# Patient Record
Sex: MALE | Race: WHITE | NOT HISPANIC OR LATINO
[De-identification: names, ages, dates, MRNs, and addresses within clinical notes are randomized per-mention and may not be internally consistent; named-entity substitution may affect disease eponyms.]

---

## 2021-10-18 ENCOUNTER — APPOINTMENT (OUTPATIENT)
Dept: CARDIOLOGY | Facility: CLINIC | Age: 66
End: 2021-10-18
Payer: COMMERCIAL

## 2021-10-18 ENCOUNTER — RESULT CHARGE (OUTPATIENT)
Age: 66
End: 2021-10-18

## 2021-10-18 VITALS — HEART RATE: 52 BPM | DIASTOLIC BLOOD PRESSURE: 72 MMHG | SYSTOLIC BLOOD PRESSURE: 117 MMHG

## 2021-10-18 VITALS — HEIGHT: 69 IN | TEMPERATURE: 97.6 F | WEIGHT: 204 LBS | BODY MASS INDEX: 30.21 KG/M2

## 2021-10-18 DIAGNOSIS — Z82.49 FAMILY HISTORY OF ISCHEMIC HEART DISEASE AND OTHER DISEASES OF THE CIRCULATORY SYSTEM: ICD-10-CM

## 2021-10-18 DIAGNOSIS — F17.200 NICOTINE DEPENDENCE, UNSPECIFIED, UNCOMPLICATED: ICD-10-CM

## 2021-10-18 DIAGNOSIS — Z78.9 OTHER SPECIFIED HEALTH STATUS: ICD-10-CM

## 2021-10-18 PROBLEM — Z00.00 ENCOUNTER FOR PREVENTIVE HEALTH EXAMINATION: Noted: 2021-10-18

## 2021-10-18 PROCEDURE — 99203 OFFICE O/P NEW LOW 30 MIN: CPT

## 2021-10-18 PROCEDURE — 93228 REMOTE 30 DAY ECG REV/REPORT: CPT

## 2021-10-18 NOTE — PHYSICAL EXAM
[Well Developed] : well developed [Well Nourished] : well nourished [No Acute Distress] : no acute distress [Normal Conjunctiva] : normal conjunctiva [Normal Venous Pressure] : normal venous pressure [5th Left ICS - MCL] : palpated at the 5th LICS in the midclavicular line [Normal] : normal [No Precordial Heave] : no precordial heave was noted [Rhythm Regular] : regular [Normal S1] : normal S1 [Normal S2] : normal S2 [No Murmur] : no murmurs heard [No Pitting Edema] : no pitting edema present [2+] : left 2+ [Clear Lung Fields] : clear lung fields [Good Air Entry] : good air entry [No Respiratory Distress] : no respiratory distress  [Soft] : abdomen soft [Non Tender] : non-tender [Normal Bowel Sounds] : normal bowel sounds [Normal Gait] : normal gait [No Edema] : no edema [No Varicosities] : no varicosities [No Rash] : no rash [Moves all extremities] : moves all extremities [No Focal Deficits] : no focal deficits [Alert and Oriented] : alert and oriented [Bradycardia] : bradycardic

## 2021-10-18 NOTE — REVIEW OF SYSTEMS
[Negative] : Heme/Lymph [Syncope] : syncope [SOB] : no shortness of breath [Dyspnea on exertion] : not dyspnea during exertion [Chest Discomfort] : no chest discomfort [Palpitations] : no palpitations [Orthopnea] : no orthopnea

## 2021-10-18 NOTE — HISTORY OF PRESENT ILLNESS
[FreeTextEntry1] : Ms. Dominguez is a 67yo male with PMHx of DM, depression, obesity who presents for initial evaluation and treatment of syncope and bradycardia. He is a patient of Dr. Kimani Eller. Patient has been feeling well. Over the weekend, he had "cheated" on his diet and had 2 cigars and sambuca, while watching football. He felt very tired after this. When he went to sit down for dinner, he felt lightheaded and passed out. He did not sustain any injuries. He briefly lost conciousness. His blood sugar was elevated (FS 200s) and BP and HR were normal, per wife. He has never had an episode like this before. Denies CP, SOB, palpitations.  Father with heart attack in 50s, mother heart condition (PPM placed), brother AF.

## 2021-10-18 NOTE — REASON FOR VISIT
[Other: ____] : [unfilled] [FreeTextEntry1] : Diagnostic Tests:\par --------------------\par EKG:\par 10/18/21-Sinus bradycardia at 50 bpm. 1st degree AVB. IVCD.

## 2021-10-18 NOTE — ASSESSMENT
[FreeTextEntry1] : Syncope: Patient with syncope in setting of sinus remi, first degree AVB and IVCD. No history of CAD in past, but with risk factors. EKG non-ischemic. Patient with prodrome prior to syncope and remembers event. \par -Check echocardiogram for structural heart disease.\par -MCOT to be placed in office 10/19/21. \par -Will monitor for episodes of symptomatic bradycardia and pauses. \par -Directed patient to go to ED if episode occurs again. \par -May need to pursue ischemic work-up. \par \par DM: HA1c 5.8% (7/16/21)\par -Continue metformin 1000mg PO bid. \par -Continue with atorvastatin (pt unsure of dose). \par \par Dyslipidemia: , LDL 67, HDL 50, TG 82\par -Continue with atorvastatin. \par \par Follow up in 2 weeks. \par

## 2021-10-28 ENCOUNTER — APPOINTMENT (OUTPATIENT)
Dept: CARDIOLOGY | Facility: CLINIC | Age: 66
End: 2021-10-28
Payer: COMMERCIAL

## 2021-10-28 PROCEDURE — 93306 TTE W/DOPPLER COMPLETE: CPT

## 2021-11-01 NOTE — PHYSICAL EXAM
[Well Developed] : well developed [Well Nourished] : well nourished [No Acute Distress] : no acute distress [Normal Conjunctiva] : normal conjunctiva [Normal Venous Pressure] : normal venous pressure [5th Left ICS - MCL] : palpated at the 5th LICS in the midclavicular line [Normal] : normal [No Precordial Heave] : no precordial heave was noted [Bradycardia] : bradycardic [Rhythm Regular] : regular [Normal S1] : normal S1 [Normal S2] : normal S2 [No Murmur] : no murmurs heard [No Pitting Edema] : no pitting edema present [2+] : left 2+ [Clear Lung Fields] : clear lung fields [Good Air Entry] : good air entry [No Respiratory Distress] : no respiratory distress  [Soft] : abdomen soft [Non Tender] : non-tender [Normal Bowel Sounds] : normal bowel sounds [Normal Gait] : normal gait [No Edema] : no edema [No Varicosities] : no varicosities [No Rash] : no rash [Moves all extremities] : moves all extremities [No Focal Deficits] : no focal deficits [Alert and Oriented] : alert and oriented

## 2021-11-04 ENCOUNTER — APPOINTMENT (OUTPATIENT)
Dept: CARDIOLOGY | Facility: CLINIC | Age: 66
End: 2021-11-04
Payer: COMMERCIAL

## 2021-11-04 VITALS
BODY MASS INDEX: 29.92 KG/M2 | HEIGHT: 69 IN | DIASTOLIC BLOOD PRESSURE: 64 MMHG | WEIGHT: 202 LBS | TEMPERATURE: 96.8 F | SYSTOLIC BLOOD PRESSURE: 122 MMHG

## 2021-11-04 VITALS — HEART RATE: 54 BPM

## 2021-11-04 DIAGNOSIS — E11.9 TYPE 2 DIABETES MELLITUS W/OUT COMPLICATIONS: ICD-10-CM

## 2021-11-04 PROCEDURE — 99212 OFFICE O/P EST SF 10 MIN: CPT

## 2021-11-04 PROCEDURE — 93000 ELECTROCARDIOGRAM COMPLETE: CPT

## 2021-11-04 RX ORDER — ATORVASTATIN CALCIUM 80 MG/1
TABLET, FILM COATED ORAL
Refills: 0 | Status: DISCONTINUED | COMMUNITY
End: 2021-11-04

## 2021-11-04 NOTE — HISTORY OF PRESENT ILLNESS
[FreeTextEntry1] : Ms. Dominguez is a 67yo male with PMHx of DM, depression, obesity who presents for initial evaluation and treatment of syncope and bradycardia. He is a patient of Dr. Kimani Eller. Patient has been feeling well. Over the weekend, he had "cheated" on his diet and had 2 cigars and sambuca, while watching football. He felt very tired after this. When he went to sit down for dinner, he felt lightheaded and passed out. He did not sustain any injuries. He briefly lost consciousness. His blood sugar was elevated (FS 200s) and BP and HR were normal, per wife. He has never had an episode like this before. Denies CP, SOB, palpitations.  Father with heart attack in 50s, mother heart condition (PPM placed), brother AF. He still feels fatigued but no other syncopal episodes.

## 2021-11-04 NOTE — ASSESSMENT
[FreeTextEntry1] : Syncope: Patient with syncope in setting of sinus remi, first degree AVB and IVCD. No history of CAD in past, but with risk factors. EKG non-ischemic. Patient with prodrome prior to syncope and remembers event. TTE without structural abnormalities. MCOT without any pauses or HB. \par -MCOT placed in office 10/19/21. Will follow daily and final report. \par -Directed patient to go to ED if episode occurs again. \par -Will get stress echocardiogram to check for ischemia and chronotropic incompetence. \par \par DM: HA1c 5.8% (7/16/21)\par -Continue metformin 1000mg PO bid. \par -Continue with atorvastatin 10mg PO daily. \par \par Dyslipidemia: , LDL 67, HDL 50, TG 82\par -Continue with atorvastatin. \par \par Follow up in 3 months. \par

## 2021-11-04 NOTE — REASON FOR VISIT
[Other: ____] : [unfilled] [FreeTextEntry1] : Diagnostic Tests:\par --------------------\par EKG:\par 11/04/21-Sinus bradycardia @ 54 bpm. Possible old septal MI. \par 10/18/21-Sinus bradycardia at 50 bpm. 1st degree AVB. IVCD. \par --------------------\par Echo:\par 10/28/21-TTE: EF 65%. Mild MV leaflet thickening. Trace MR.

## 2021-11-04 NOTE — REVIEW OF SYSTEMS
[Syncope] : syncope [Negative] : Heme/Lymph [SOB] : no shortness of breath [Dyspnea on exertion] : not dyspnea during exertion [Chest Discomfort] : no chest discomfort [Palpitations] : no palpitations [Orthopnea] : no orthopnea

## 2021-11-29 ENCOUNTER — APPOINTMENT (OUTPATIENT)
Dept: CARDIOLOGY | Facility: CLINIC | Age: 66
End: 2021-11-29
Payer: COMMERCIAL

## 2021-11-29 PROCEDURE — 93351 STRESS TTE COMPLETE: CPT

## 2021-11-29 PROCEDURE — 93325 DOPPLER ECHO COLOR FLOW MAPG: CPT

## 2021-11-29 PROCEDURE — 93321 DOPPLER ECHO F-UP/LMTD STD: CPT

## 2022-02-17 ENCOUNTER — APPOINTMENT (OUTPATIENT)
Dept: CARDIOLOGY | Facility: CLINIC | Age: 67
End: 2022-02-17

## 2022-03-03 ENCOUNTER — APPOINTMENT (OUTPATIENT)
Dept: CARDIOLOGY | Facility: CLINIC | Age: 67
End: 2022-03-03

## 2022-06-09 ENCOUNTER — APPOINTMENT (OUTPATIENT)
Dept: CARDIOLOGY | Facility: CLINIC | Age: 67
End: 2022-06-09
Payer: COMMERCIAL

## 2022-06-09 VITALS — HEIGHT: 69 IN | TEMPERATURE: 98.6 F | BODY MASS INDEX: 30.51 KG/M2 | WEIGHT: 206 LBS

## 2022-06-09 VITALS — HEART RATE: 58 BPM | DIASTOLIC BLOOD PRESSURE: 78 MMHG | SYSTOLIC BLOOD PRESSURE: 112 MMHG

## 2022-06-09 PROCEDURE — 99212 OFFICE O/P EST SF 10 MIN: CPT

## 2022-06-09 PROCEDURE — 93000 ELECTROCARDIOGRAM COMPLETE: CPT

## 2022-06-09 NOTE — REASON FOR VISIT
[Other: ____] : [unfilled] [FreeTextEntry1] : Diagnostic Tests:\par --------------------\par EKG:\par 06/09/22-Sinus bradycardia at 58 bpm. IVCD. \par 11/04/21-Sinus bradycardia @ 54 bpm. Possible old septal MI. \par 10/18/21-Sinus bradycardia at 50 bpm. 1st degree AVB. IVCD. \par --------------------\par Echo:\par 10/28/21-TTE: EF 65%. Mild MV leaflet thickening. Trace MR. \par --------------------\par Stress:\par 11/29/21- Exercise TTE: Negative stress test. Exercised 10:23 with 87% MPHR, 12.1 METS. \par --------------------\par Monitor:\par 11/22/21- MCOT: 2 week monitor time. 1st degree AVB with HR 48 bpm. Asymptomatic. Low burden PVCs, PACs.

## 2022-06-09 NOTE — ASSESSMENT
[FreeTextEntry1] : Syncope:\par -  Patient has Hx of syncope in setting of sinus remi, first degree AVB and IVCD. No history of CAD in past, but with risk factors. EKG non-ischemic. Patient with prodrome prior to syncope and remembers event. TTE without structural abnormalities. MCOT without any pauses or HB. \par -Episode likely due to food and alcohol intake. Likely vasovagal. \par -Directed patient to go to ED if episode occurs again. \par \par Claudication of lower extremities\par - will obtain WINDY/PVR\par - arterial doppler of LE to r/o PAD\par \par DM: HA1c 6.4% (7/16/21)\par -Continue metformin 1000mg PO bid. \par -Continue with atorvastatin 10mg PO daily. \par - follow up with endocrinology\par \par Dyslipidemia: \par -Continue with atorvastatin 10 mg PO daily \par \par Follow up in 3 months. \par

## 2022-06-09 NOTE — HISTORY OF PRESENT ILLNESS
[FreeTextEntry1] : Ms. Dominguez is a 67yo male with PMHx of DM, depression, obesity who presents for initial evaluation and treatment of syncope and bradycardia. He is a patient of Dr. Kimani Eller. Patient has been feeling well. Over the weekend, he had "cheated" on his diet and had 2 cigars and sambuca, while watching football. He felt very tired after this. When he went to sit down for dinner, he felt lightheaded and passed out. He did not sustain any injuries. He briefly lost consciousness. His blood sugar was elevated (FS 200s) and BP and HR were normal, per wife. He has never had an episode like this before. Denies CP, SOB, palpitations.  Father with heart attack in 50s, mother heart condition (PPM placed), brother AF. He still feels fatigued but no other syncopal episodes. \par \par 06/09/2022: Patient presents for F/U cardiology visit. He denies recurrent syncope episodes. Cholesterol is at target on current regimen. HgA1C<6.4, DM is well controlled. Patient has a scheduled appointment with endocrinologist soon. Patient complains of leg pain, feet pain while asleep. Has noticeable hair loss in patches on lower and upper extremities. \par \par

## 2022-06-09 NOTE — REVIEW OF SYSTEMS
[Syncope] : syncope [Muscle Cramps] : muscle cramps [Negative] : Heme/Lymph [FreeTextEntry9] : back pain [SOB] : no shortness of breath [Dyspnea on exertion] : not dyspnea during exertion [Chest Discomfort] : no chest discomfort [Palpitations] : no palpitations [Orthopnea] : no orthopnea

## 2022-06-11 NOTE — REVIEW OF SYSTEMS
[SOB] : no shortness of breath [Dyspnea on exertion] : not dyspnea during exertion [Chest Discomfort] : no chest discomfort [Palpitations] : no palpitations [Orthopnea] : no orthopnea

## 2022-06-11 NOTE — REASON FOR VISIT
[FreeTextEntry1] : Diagnostic Tests:\par --------------------\par EKG:\par 11/04/21-Sinus bradycardia @ 54 bpm. Possible old septal MI. \par 10/18/21-Sinus bradycardia at 50 bpm. 1st degree AVB. IVCD. \par --------------------\par Echo:\par 10/28/21-TTE: EF 65%. Mild MV leaflet thickening. Trace MR. \par --------------------\par Stress:\par 11/29/21- Exercise TTE: Negative stress test. Exercised 10:23 with 87% MPHR, 12.1 METS. \par --------------------\par Monitor:\par 11/22/21- MCOT: 2 week monitor time. 1st degree AVB with HR 48 bpm. Asymptomatic. Low burden PVCs, PACs.

## 2022-06-11 NOTE — ASSESSMENT
[FreeTextEntry1] : Syncope: Patient with syncope in setting of sinus remi, first degree AVB and IVCD. No history of CAD in past, but with risk factors. EKG non-ischemic. Patient with prodrome prior to syncope and remembers event. TTE without structural abnormalities. MCOT without any pauses or HB. \par -Episode likely due to food and alcohol intake. Likely vasovagal. \par -Directed patient to go to ED if episode occurs again. \par \par \par DM: HA1c 5.8% (7/16/21)\par -Continue metformin 1000mg PO bid. \par -Continue with atorvastatin 10mg PO daily. \par \par Dyslipidemia: , LDL 67, HDL 50, TG 82\par -Continue with atorvastatin. \par \par Follow up in 3 months. \par

## 2022-06-11 NOTE — HISTORY OF PRESENT ILLNESS
[FreeTextEntry1] : Ms. Dominguez is a 65yo male with PMHx of DM, depression, obesity who presents for initial evaluation and treatment of syncope and bradycardia. He is a patient of Dr. Kimani Eller. Patient has been feeling well. Over the weekend, he had "cheated" on his diet and had 2 cigars and sambuca, while watching football. He felt very tired after this. When he went to sit down for dinner, he felt lightheaded and passed out. He did not sustain any injuries. He briefly lost consciousness. His blood sugar was elevated (FS 200s) and BP and HR were normal, per wife. He has never had an episode like this before. Denies CP, SOB, palpitations.  Father with heart attack in 50s, mother heart condition (PPM placed), brother AF. He still feels fatigued but no other syncopal episodes.

## 2022-07-27 ENCOUNTER — APPOINTMENT (OUTPATIENT)
Dept: NEUROLOGY | Facility: CLINIC | Age: 67
End: 2022-07-27

## 2022-07-27 DIAGNOSIS — M54.16 RADICULOPATHY, LUMBAR REGION: ICD-10-CM

## 2022-07-27 PROCEDURE — 99072 ADDL SUPL MATRL&STAF TM PHE: CPT

## 2022-07-27 PROCEDURE — 99214 OFFICE O/P EST MOD 30 MIN: CPT

## 2022-07-27 NOTE — PHYSICAL EXAM
[Person] : oriented to person [Place] : oriented to place [Time] : oriented to time [Concentration Intact] : normal concentrating ability [Visual Intact] : visual attention was ~T not ~L decreased [Naming Objects] : no difficulty naming common objects [Repeating Phrases] : no difficulty repeating a phrase [Writing A Sentence] : no difficulty writing a sentence [Fluency] : fluency intact [Comprehension] : comprehension intact [Reading] : reading intact [Past History] : adequate knowledge of personal past history [Cranial Nerves Optic (II)] : visual acuity intact bilaterally,  visual fields full to confrontation, pupils equal round and reactive to light [Cranial Nerves Oculomotor (III)] : extraocular motion intact [Cranial Nerves Trigeminal (V)] : facial sensation intact symmetrically [Cranial Nerves Facial (VII)] : face symmetrical [Cranial Nerves Vestibulocochlear (VIII)] : hearing was intact bilaterally [Cranial Nerves Glossopharyngeal (IX)] : tongue and palate midline [Cranial Nerves Accessory (XI - Cranial And Spinal)] : head turning and shoulder shrug symmetric [Cranial Nerves Hypoglossal (XII)] : there was no tongue deviation with protrusion [Motor Tone] : muscle tone was normal in all four extremities [Motor Strength] : muscle strength was normal in all four extremities [No Muscle Atrophy] : normal bulk in all four extremities [Abnormal Walk] : normal gait [Balance] : balance was intact [Past-pointing] : there was no past-pointing [Tremor] : no tremor present [1+] : Ankle jerk left 1+ [Plantar Reflex Right Only] : normal on the right [Plantar Reflex Left Only] : normal on the left [FreeTextEntry6] :   Limited range of motion in the back, slight pain limited weakness in the lower extremities bilaterally 4+/5.  Negative straight leg test bilaterally [FreeTextEntry7] :  decreased sensation distally in both feet [FreeTextEntry8] :  slight antalgic gait, walks with cane, slow and cautious when  changing 1 position to the other

## 2022-07-27 NOTE — HISTORY OF PRESENT ILLNESS
[FreeTextEntry1] : Patient is a 67 year old man who presents for initial neurology consultation regards to lower back pain precipitated by work related injury he sustained on July 18, 2022. The patient reports he was in the bathroom when he slipped on some water landing on back and buttocks. Since the incident patient developed lower back pain that radiates down lower extremities posteriorly with associated burning and discomfort sensation of bilateral feet. He initially rated his pain 10/10 on the pain scale. He was given Prednisone and muscle relaxant which offers temporary relief.   The pain started coming back, mostly in his legs after the effect of steroid  worn off.  He now rates the baseline pain of 7/10 with pain shooting down both of his legs and with his toes feel numb.   He is currently ambulating with a cane since the fall. He continues to be able to shower and dress himself but slowly., and has been working from home.\par \par  he did have back injury many years ago but had been asymptomatic until this recent accident happened.  Even though he is diabetic, he has never had symptoms of neuropathy in the past.

## 2022-07-27 NOTE — ASSESSMENT
[FreeTextEntry1] : lumbar radiculopathy -I would like to send [Him/Her] for MRI of the lumbarl spine without quincy, and EMG/NCS of lower extremities for further evaluation.  For symptomatic relief, I recommend a trial of conservative treatment including physical therapy/chiropractics/acupuncture as well as the use of gabapentin, but if He is not better in a month, I will refer her to pain management for further treatment.  I warned him of the potential drowsiness side effect of gabapentin and he reported understanding.\par \par \par  his symptom is causally related to the work related injury that he sustained, he is considered to have   Moderate disability due to pain but is able to work from home.

## 2022-08-01 PROBLEM — Z00.00 ENCOUNTER FOR PREVENTIVE HEALTH EXAMINATION: Status: ACTIVE | Noted: 2022-08-01

## 2022-08-09 ENCOUNTER — APPOINTMENT (OUTPATIENT)
Dept: CARDIOLOGY | Facility: CLINIC | Age: 67
End: 2022-08-09

## 2022-08-09 PROCEDURE — 93923 UPR/LXTR ART STDY 3+ LVLS: CPT

## 2022-08-09 PROCEDURE — 93925 LOWER EXTREMITY STUDY: CPT

## 2022-08-18 ENCOUNTER — APPOINTMENT (OUTPATIENT)
Dept: NEUROLOGY | Facility: CLINIC | Age: 67
End: 2022-08-18

## 2022-08-23 ENCOUNTER — APPOINTMENT (OUTPATIENT)
Dept: ENDOCRINOLOGY | Facility: CLINIC | Age: 67
End: 2022-08-23

## 2022-08-26 ENCOUNTER — APPOINTMENT (OUTPATIENT)
Dept: ORTHOPEDIC SURGERY | Facility: CLINIC | Age: 67
End: 2022-08-26

## 2022-09-07 ENCOUNTER — APPOINTMENT (OUTPATIENT)
Dept: NEUROLOGY | Facility: CLINIC | Age: 67
End: 2022-09-07

## 2022-10-04 ENCOUNTER — APPOINTMENT (OUTPATIENT)
Dept: ENDOCRINOLOGY | Facility: CLINIC | Age: 67
End: 2022-10-04

## 2022-10-04 VITALS
SYSTOLIC BLOOD PRESSURE: 130 MMHG | TEMPERATURE: 98.2 F | HEART RATE: 109 BPM | DIASTOLIC BLOOD PRESSURE: 70 MMHG | WEIGHT: 207 LBS | BODY MASS INDEX: 30.66 KG/M2 | HEIGHT: 69 IN

## 2022-10-04 PROCEDURE — 99204 OFFICE O/P NEW MOD 45 MIN: CPT

## 2022-10-04 RX ORDER — GABAPENTIN 100 MG/1
100 CAPSULE ORAL 3 TIMES DAILY
Qty: 90 | Refills: 2 | Status: COMPLETED | COMMUNITY
Start: 2022-07-27 | End: 2022-10-04

## 2022-10-06 ENCOUNTER — RESULT CHARGE (OUTPATIENT)
Age: 67
End: 2022-10-06

## 2022-10-06 ENCOUNTER — APPOINTMENT (OUTPATIENT)
Dept: CARDIOLOGY | Facility: CLINIC | Age: 67
End: 2022-10-06

## 2022-10-06 VITALS — WEIGHT: 201 LBS | TEMPERATURE: 97.6 F | BODY MASS INDEX: 29.77 KG/M2 | HEIGHT: 69 IN

## 2022-10-06 VITALS — HEART RATE: 64 BPM

## 2022-10-06 VITALS — DIASTOLIC BLOOD PRESSURE: 80 MMHG | SYSTOLIC BLOOD PRESSURE: 120 MMHG

## 2022-10-06 DIAGNOSIS — M54.16 RADICULOPATHY, LUMBAR REGION: ICD-10-CM

## 2022-10-06 DIAGNOSIS — R00.1 BRADYCARDIA, UNSPECIFIED: ICD-10-CM

## 2022-10-06 DIAGNOSIS — M48.062 SPINAL STENOSIS, LUMBAR REGION WITH NEUROGENIC CLAUDICATION: ICD-10-CM

## 2022-10-06 DIAGNOSIS — R55 SYNCOPE AND COLLAPSE: ICD-10-CM

## 2022-10-06 PROCEDURE — 99213 OFFICE O/P EST LOW 20 MIN: CPT

## 2022-10-06 PROCEDURE — 93000 ELECTROCARDIOGRAM COMPLETE: CPT

## 2022-10-06 RX ORDER — SERTRALINE HYDROCHLORIDE 150 MG/1
150 CAPSULE ORAL
Refills: 0 | Status: ACTIVE | COMMUNITY

## 2022-10-06 RX ORDER — SERTRALINE HYDROCHLORIDE 100 MG/1
100 TABLET, FILM COATED ORAL
Refills: 0 | Status: DISCONTINUED | COMMUNITY
End: 2022-10-06

## 2022-10-06 NOTE — HISTORY OF PRESENT ILLNESS
[FreeTextEntry1] : Ms. Dominguez is a 67yo male with PMHx of DM, depression, obesity who presents for initial evaluation and treatment of syncope and bradycardia. He is a patient of Dr. Kimani Eller. Patient has been feeling well. Over the weekend, he had "cheated" on his diet and had 2 cigars and sambuca, while watching football. He felt very tired after this. When he went to sit down for dinner, he felt lightheaded and passed out. He did not sustain any injuries. He briefly lost consciousness. His blood sugar was elevated (FS 200s) and BP and HR were normal, per wife. He has never had an episode like this before. Denies CP, SOB, palpitations.  Father with heart attack in 50s, mother heart condition (PPM placed), brother AF. He still feels fatigued but no other syncopal episodes. \par \par -About 3 months ago, he had a slip and fall at work and aggravated spinal stenosis. Currently undergoing epidural steroid injections. He feels somewhat better. No significant LE arterial stenosis to explain leg cramping and pain. \par

## 2022-10-06 NOTE — REVIEW OF SYSTEMS
[Muscle Cramps] : muscle cramps [Syncope] : syncope [Negative] : Heme/Lymph [FreeTextEntry9] : back pain [SOB] : no shortness of breath [Dyspnea on exertion] : not dyspnea during exertion [Chest Discomfort] : no chest discomfort [Palpitations] : no palpitations [Orthopnea] : no orthopnea

## 2022-10-06 NOTE — REASON FOR VISIT
[Other: ____] : [unfilled] [FreeTextEntry1] : Diagnostic Tests:\par --------------------\par EKG:\par 10/06/22-NSR. \par 06/09/22-Sinus bradycardia at 58 bpm. IVCD. \par 11/04/21-Sinus bradycardia at 54 bpm. Possible old septal MI. \par 10/18/21-Sinus bradycardia at 50 bpm. 1st degree AVB. IVCD. \par --------------------\par Echo:\par 10/28/21-TTE: EF 65%. Mild MV leaflet thickening. Trace MR. \par --------------------\par Stress:\par 11/29/21- Exercise TTE: Negative stress test. Exercised 10:23 with 87% MPHR, 12.1 METS. \par --------------------\par Monitor:\par 11/22/21- MCOT: 2 week monitor time. 1st degree AVB with HR 48 bpm. Asymptomatic. Low burden PVCs, PACs. \par -------------------\par Physio:\par 08/09/22-WINDY/PVR: Normal WINDY B/L. Noncompressible high/low thigh, possible calcific disease. \par -------------------\par US: \par 08/10/22-LE Arterial: Mild diffuse atherosclerosis B/L. Right Pop <20%. Left CFA 20-49%. Left Pop <20%.

## 2022-10-06 NOTE — ASSESSMENT
[FreeTextEntry1] : Syncope:\par -  Patient has Hx of syncope in setting of sinus remi, first degree AVB and IVCD. No history of CAD in past, but with risk factors. EKG non-ischemic. Patient with prodrome prior to syncope and remembers event. TTE without structural abnormalities. MCOT without any pauses or HB. \par -Episode likely due to food and alcohol intake. Likely vasovagal. \par -Directed patient to go to ED if episode occurs again. \par \par Claudication of lower extremities: Mild B/L LE atherosclerosis.\par -Will continue to monitor and treat risk factors. \par \par DM: HA1c 6.4%-> 6.8% (7/16/21)\par -Continue metformin 1000mg PO bid. \par -Continue with atorvastatin 10mg PO daily. \par - follow up with endocrinology\par \par Dyslipidemia: , TG 79, HDL 54, LDL 89\par -Continue with atorvastatin 10 mg PO daily \par -Discussed therapeutic lifestyle changes to promote improved lipid metabolism\par \par \par Follow up in 6 months. \par

## 2022-10-10 NOTE — THERAPY
[Today's Date] : [unfilled] [BG Target = ____] : BG Target = [unfilled] [FreeTextEntry7] : metformin 500 mg bid

## 2022-10-10 NOTE — DATA REVIEWED
[FreeTextEntry1] : 9/30/22 glucose 146, bun 30, creatinine 1.01, gfr 82, bun 30 , hgba1c 6.8 h, microalbumin <0.2, chol 160, hdl 54, trig 79, ldl 89,tsh 2.09, ft4 1.2

## 2022-10-10 NOTE — HISTORY OF PRESENT ILLNESS
[Finger Stick] : Finger Stick: Yes [Continuous Glucose Monitoring] : Continuous Glucose Monitoring: No [Hypoglycemia] : Patient is not hypoglycemic. [FreeTextEntry9] : 155-160 [de-identified] : 140 [FreeTextEntry1] : 1-3 x a day prn

## 2022-10-10 NOTE — ASSESSMENT
[Diabetes Foot Care] : diabetes foot care [Long Term Vascular Complications] : long term vascular complications of diabetes [Carbohydrate Consistent Diet] : carbohydrate consistent diet [Importance of Diet and Exercise] : importance of diet and exercise to improve glycemic control, achieve weight loss and improve cardiovascular health [Exercise/Effect on Glucose] : exercise/effect on glucose [Hypoglycemia Management] : hypoglycemia management [Ketone Testing] : ketone testing [Self Monitoring of Blood Glucose] : self monitoring of blood glucose [Sick-Day Management] : sick-day management [Retinopathy Screening] : Patient was referred to ophthalmology for retinopathy screening [Weight Loss] : weight loss [Diabetic Medications] : Risks and benefits of diabetic medications were discussed [FreeTextEntry4] : [address] ada diet, exercise

## 2022-10-10 NOTE — PHYSICAL EXAM
[Alert] : alert [Well Nourished] : well nourished [Obese] : obese [No Acute Distress] : no acute distress [Well Developed] : well developed [Normal Sclera/Conjunctiva] : normal sclera/conjunctiva [EOMI] : extra ocular movement intact [No Proptosis] : no proptosis [Normal Outer Ear/Nose] : the ears and nose were normal in appearance [Normal Hearing] : hearing was normal [Normal Oropharynx] : the oropharynx was normal [Supple] : the neck was supple [Thyroid Not Enlarged] : the thyroid was not enlarged [No Respiratory Distress] : no respiratory distress [No Accessory Muscle Use] : no accessory muscle use [Normal Rate and Effort] : normal respiratory rate and effort [Clear to Auscultation] : lungs were clear to auscultation bilaterally [Normal S1, S2] : normal S1 and S2 [Normal Rate] : heart rate was normal [Regular Rhythm] : with a regular rhythm [Carotids Normal] : carotid pulses were normal with no bruits [No Edema] : no peripheral edema [Pedal Pulses Normal] : the pedal pulses are present [Normal Bowel Sounds] : normal bowel sounds [Not Tender] : non-tender [Not Distended] : not distended [Soft] : abdomen soft [Normal Anterior Cervical Nodes] : no anterior cervical lymphadenopathy [Normal Posterior Cervical Nodes] : no posterior cervical lymphadenopathy [No CVA Tenderness] : no ~M costovertebral angle tenderness [No Spinal Tenderness] : no spinal tenderness [Spine Straight] : spine straight [No Stigmata of Cushings Syndrome] : no stigmata of Cushings Syndrome [Normal Gait] : normal gait [No Involuntary Movements] : no involuntary movements were seen [Normal Strength/Tone] : muscle strength and tone were normal [No Rash] : no rash [Cranial Nerves Intact] : cranial nerves 2-12 were intact [No Motor Deficits] : the motor exam was normal [Normal Reflexes] : deep tendon reflexes were 2+ and symmetric [No Tremors] : no tremors [Oriented x3] : oriented to person, place, and time [Normal Affect] : the affect was normal [Normal Mood] : the mood was normal [Kyphosis] : no kyphosis present [Scoliosis] : no scoliosis [Acanthosis Nigricans] : no acanthosis nigricans [de-identified] : DM2

## 2022-10-10 NOTE — REVIEW OF SYSTEMS
[Recent Weight Loss (___ Lbs)] : recent weight loss: [unfilled] lbs [Back Pain] : back pain [Anxiety] : anxiety [Stress] : stress [Negative] : Heme/Lymph [Depression] : no depression [Suicidal Ideation] : no suicidal ideation [Insomnia] : no insomnia [Homicidal Ideation] : no homicidal ideation [FreeTextEntry9] : epidural this past 2 months [de-identified] : anger management [de-identified] : DM2

## 2022-12-29 ENCOUNTER — APPOINTMENT (OUTPATIENT)
Dept: ENDOCRINOLOGY | Facility: CLINIC | Age: 67
End: 2022-12-29

## 2022-12-29 VITALS
HEART RATE: 84 BPM | DIASTOLIC BLOOD PRESSURE: 70 MMHG | BODY MASS INDEX: 31.25 KG/M2 | SYSTOLIC BLOOD PRESSURE: 130 MMHG | RESPIRATION RATE: 18 BRPM | HEIGHT: 69 IN | OXYGEN SATURATION: 98 % | WEIGHT: 211 LBS | TEMPERATURE: 97.5 F

## 2022-12-29 DIAGNOSIS — I73.9 PERIPHERAL VASCULAR DISEASE, UNSPECIFIED: ICD-10-CM

## 2022-12-29 PROCEDURE — 99214 OFFICE O/P EST MOD 30 MIN: CPT

## 2022-12-29 NOTE — ASSESSMENT
[Diabetes Foot Care] : diabetes foot care [Long Term Vascular Complications] : long term vascular complications of diabetes [Carbohydrate Consistent Diet] : carbohydrate consistent diet [Importance of Diet and Exercise] : importance of diet and exercise to improve glycemic control, achieve weight loss and improve cardiovascular health [Exercise/Effect on Glucose] : exercise/effect on glucose [Hypoglycemia Management] : hypoglycemia management [Glucagon Use] : glucagon use [Ketone Testing] : ketone testing [Action and use of Insulin] : action and use of short and long-acting insulin [Self Monitoring of Blood Glucose] : self monitoring of blood glucose [Insulin Self-Administration] : insulin self-administration [Injection Technique, Storage, Sharps Disposal] : injection technique, storage, and sharps disposal [Sick-Day Management] : sick-day management [Retinopathy Screening] : Patient was referred to ophthalmology for retinopathy screening [Weight Loss] : weight loss [Diabetic Medications] : Risks and benefits of diabetic medications were discussed [FreeTextEntry4] : 1800 rick ada diet, lose weight, exercise

## 2022-12-29 NOTE — REVIEW OF SYSTEMS
[Recent Weight Gain (___ Lbs)] : recent weight gain: [unfilled] lbs [Back Pain] : back pain [Negative] : Heme/Lymph

## 2022-12-29 NOTE — DATA REVIEWED
[FreeTextEntry1] : 12/21/22 glucose 130, hgba a1c 6.4, chol 143, hdl 46, trig 100, ldl 78, tsh 1.88, ft4 1.1

## 2022-12-29 NOTE — PHYSICAL EXAM
[Alert] : alert [Well Nourished] : well nourished [Obese] : obese [No Acute Distress] : no acute distress [Well Developed] : well developed [Normal Sclera/Conjunctiva] : normal sclera/conjunctiva [EOMI] : extra ocular movement intact [No Proptosis] : no proptosis [Normal Outer Ear/Nose] : the ears and nose were normal in appearance [Normal Hearing] : hearing was normal [Normal Oropharynx] : the oropharynx was normal [Supple] : the neck was supple [Thyroid Not Enlarged] : the thyroid was not enlarged [No Respiratory Distress] : no respiratory distress [No Accessory Muscle Use] : no accessory muscle use [Normal Rate and Effort] : normal respiratory rate and effort [Clear to Auscultation] : lungs were clear to auscultation bilaterally [Normal S1, S2] : normal S1 and S2 [Normal Rate] : heart rate was normal [Regular Rhythm] : with a regular rhythm [Carotids Normal] : carotid pulses were normal with no bruits [No Edema] : no peripheral edema [Pedal Pulses Normal] : the pedal pulses are present [Normal Bowel Sounds] : normal bowel sounds [Not Tender] : non-tender [Not Distended] : not distended [Soft] : abdomen soft [Normal Anterior Cervical Nodes] : no anterior cervical lymphadenopathy [No CVA Tenderness] : no ~M costovertebral angle tenderness [Spine Straight] : spine straight [Kyphosis] : no kyphosis present [Scoliosis] : no scoliosis [No Stigmata of Cushings Syndrome] : no stigmata of Cushings Syndrome [Normal Gait] : normal gait [No Involuntary Movements] : no involuntary movements were seen [Normal Strength/Tone] : muscle strength and tone were normal [No Rash] : no rash [Acanthosis Nigricans] : no acanthosis nigricans [Cranial Nerves Intact] : cranial nerves 2-12 were intact [No Motor Deficits] : the motor exam was normal [Normal Reflexes] : deep tendon reflexes were 2+ and symmetric [No Tremors] : no tremors [Oriented x3] : oriented to person, place, and time [Normal Affect] : the affect was normal [Normal Mood] : the mood was normal [de-identified] : low back pain [de-identified] : DM2

## 2023-06-15 ENCOUNTER — APPOINTMENT (OUTPATIENT)
Dept: CARDIOLOGY | Facility: CLINIC | Age: 68
End: 2023-06-15

## 2023-06-15 NOTE — PHYSICAL EXAM
[Well Developed] : well developed [Well Nourished] : well nourished [Normal Conjunctiva] : normal conjunctiva [No Acute Distress] : no acute distress [Normal Venous Pressure] : normal venous pressure [5th Left ICS - MCL] : palpated at the 5th LICS in the midclavicular line [Normal] : normal [No Precordial Heave] : no precordial heave was noted [Bradycardia] : bradycardic [Rhythm Regular] : regular [Normal S1] : normal S1 [Normal S2] : normal S2 [No Murmur] : no murmurs heard [No Pitting Edema] : no pitting edema present [2+] : left 2+ [Clear Lung Fields] : clear lung fields [Good Air Entry] : good air entry [No Respiratory Distress] : no respiratory distress  [Soft] : abdomen soft [Non Tender] : non-tender [Normal Bowel Sounds] : normal bowel sounds [Normal Gait] : normal gait [No Edema] : no edema [No Varicosities] : no varicosities [No Rash] : no rash [Moves all extremities] : moves all extremities [No Focal Deficits] : no focal deficits [Alert and Oriented] : alert and oriented

## 2023-06-15 NOTE — REVIEW OF SYSTEMS
[Muscle Cramps] : muscle cramps [FreeTextEntry9] : back pain [SOB] : no shortness of breath [Dyspnea on exertion] : not dyspnea during exertion [Chest Discomfort] : no chest discomfort [Palpitations] : no palpitations [Orthopnea] : no orthopnea [Syncope] : syncope [Negative] : Psychiatric

## 2023-06-20 ENCOUNTER — APPOINTMENT (OUTPATIENT)
Dept: ENDOCRINOLOGY | Facility: CLINIC | Age: 68
End: 2023-06-20
Payer: MEDICARE

## 2023-06-20 VITALS
BODY MASS INDEX: 31.4 KG/M2 | HEIGHT: 69 IN | WEIGHT: 212 LBS | DIASTOLIC BLOOD PRESSURE: 80 MMHG | SYSTOLIC BLOOD PRESSURE: 122 MMHG

## 2023-06-20 DIAGNOSIS — E78.5 HYPERLIPIDEMIA, UNSPECIFIED: ICD-10-CM

## 2023-06-20 DIAGNOSIS — E66.9 OBESITY, UNSPECIFIED: ICD-10-CM

## 2023-06-20 DIAGNOSIS — E11.65 TYPE 2 DIABETES MELLITUS WITH HYPERGLYCEMIA: ICD-10-CM

## 2023-06-20 DIAGNOSIS — F32.A DEPRESSION, UNSPECIFIED: ICD-10-CM

## 2023-06-20 PROCEDURE — 99214 OFFICE O/P EST MOD 30 MIN: CPT

## 2023-06-20 RX ORDER — BLOOD SUGAR DIAGNOSTIC
STRIP MISCELLANEOUS TWICE DAILY
Qty: 200 | Refills: 3 | Status: ACTIVE | COMMUNITY
Start: 2023-06-20 | End: 1900-01-01

## 2023-06-20 RX ORDER — LANCETS 33 GAUGE
EACH MISCELLANEOUS
Qty: 200 | Refills: 3 | Status: ACTIVE | COMMUNITY
Start: 2023-06-20 | End: 1900-01-01

## 2023-06-20 RX ORDER — ATORVASTATIN CALCIUM 10 MG/1
10 TABLET, FILM COATED ORAL
Qty: 90 | Refills: 3 | Status: ACTIVE | COMMUNITY
Start: 1900-01-01 | End: 1900-01-01

## 2023-06-20 NOTE — ASSESSMENT
[Diabetes Foot Care] : diabetes foot care [Long Term Vascular Complications] : long term vascular complications of diabetes [Carbohydrate Consistent Diet] : carbohydrate consistent diet [Importance of Diet and Exercise] : importance of diet and exercise to improve glycemic control, achieve weight loss and improve cardiovascular health [Exercise/Effect on Glucose] : exercise/effect on glucose [Glucagon Use] : glucagon use [Ketone Testing] : ketone testing [Self Monitoring of Blood Glucose] : self monitoring of blood glucose [Insulin Self-Administration] : insulin self-administration [Sick-Day Management] : sick-day management [Retinopathy Screening] : Patient was referred to ophthalmology for retinopathy screening [Diabetic Medications] : Risks and benefits of diabetic medications were discussed [FreeTextEntry4] : 1800 rick ada diet, lose weight [FreeTextEntry3] : metformin 1000 bid

## 2023-06-20 NOTE — HISTORY OF PRESENT ILLNESS
[FreeTextEntry1] : patient came for follow up. Hgba1c 6.4. patient gain 1 pound since last visit. patient had laminectomy in January 31, 2023 and had physical therapy, but recently again pain in the right leg, had epidural, relief pain in the bottom part of legs, continue to have discomfort upper legs.  [Continuous Glucose Monitoring] : Continuous Glucose Monitoring: No [Finger Stick] : Finger Stick: Yes [Hypoglycemia] : Patient is not hypoglycemic. [FreeTextEntry9] : [id number] [de-identified] : [id number]

## 2023-06-20 NOTE — PHYSICAL EXAM
[Alert] : alert [Well Nourished] : well nourished [Obese] : obese [No Acute Distress] : no acute distress [Well Developed] : well developed [Normal Sclera/Conjunctiva] : normal sclera/conjunctiva [EOMI] : extra ocular movement intact [PERRL] : pupils equal, round and reactive to light [No Proptosis] : no proptosis [Normal Outer Ear/Nose] : the ears and nose were normal in appearance [Normal Hearing] : hearing was normal [Normal Oropharynx] : the oropharynx was normal [Supple] : the neck was supple [Thyroid Not Enlarged] : the thyroid was not enlarged [No Respiratory Distress] : no respiratory distress [No Accessory Muscle Use] : no accessory muscle use [Normal Rate and Effort] : normal respiratory rate and effort [Clear to Auscultation] : lungs were clear to auscultation bilaterally [Normal S1, S2] : normal S1 and S2 [Normal Rate] : heart rate was normal [Regular Rhythm] : with a regular rhythm [Carotids Normal] : carotid pulses were normal with no bruits [No Edema] : no peripheral edema [Pedal Pulses Normal] : the pedal pulses are present [Normal Bowel Sounds] : normal bowel sounds [Not Tender] : non-tender [Not Distended] : not distended [Soft] : abdomen soft [Normal Anterior Cervical Nodes] : no anterior cervical lymphadenopathy [No CVA Tenderness] : no ~M costovertebral angle tenderness [Spine Straight] : spine straight [Kyphosis] : no kyphosis present [Scoliosis] : no scoliosis [No Stigmata of Cushings Syndrome] : no stigmata of Cushings Syndrome [Normal Gait] : normal gait [No Involuntary Movements] : no involuntary movements were seen [Normal Strength/Tone] : muscle strength and tone were normal [No Rash] : no rash [Acanthosis Nigricans] : no acanthosis nigricans [Cranial Nerves Intact] : cranial nerves 2-12 were intact [No Motor Deficits] : the motor exam was normal [Normal Reflexes] : deep tendon reflexes were 2+ and symmetric [No Tremors] : no tremors [Oriented x3] : oriented to person, place, and time [Normal Affect] : the affect was normal [Normal Mood] : the mood was normal [de-identified] : walk with cane [de-identified] : low back pain [de-identified] : DM2

## 2023-06-20 NOTE — REVIEW OF SYSTEMS
[Back Pain] : back pain [Difficulty Walking] : difficulty walking [Depression] : no depression [Suicidal Ideation] : no suicidal ideation [Anxiety] : anxiety [Homicidal Ideation] : no homicidal ideation [Stress] : stress [Negative] : Heme/Lymph [FreeTextEntry9] : right leg pain [de-identified] : anger issues

## 2023-12-21 ENCOUNTER — APPOINTMENT (OUTPATIENT)
Dept: ENDOCRINOLOGY | Facility: CLINIC | Age: 68
End: 2023-12-21